# Patient Record
(demographics unavailable — no encounter records)

---

## 2025-02-24 NOTE — PHYSICAL EXAM
[General Appearance - Alert] : alert [General Appearance - In No Acute Distress] : in no acute distress [] : no respiratory distress [Respiration, Rhythm And Depth] : normal respiratory rhythm and effort [Heart Rate And Rhythm] : heart rate was normal and rhythm regular [Heart Sounds] : normal S1 and S2 [Bowel Sounds] : normal bowel sounds [Abdomen Soft] : soft [Abdomen Tenderness] : non-tender [FreeTextEntry1] : Obese abdomen [Abnormal Walk] : normal gait [Musculoskeletal - Swelling] : no joint swelling seen [Skin Color & Pigmentation] : normal skin color and pigmentation [Oriented To Time, Place, And Person] : oriented to person, place, and time [Affect] : the affect was normal

## 2025-02-24 NOTE — HISTORY OF PRESENT ILLNESS
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  He established care in February, 2024 after his initial hospitalization in January, 2024 after he presented to the hospital with hematemesis and melena. He had been planned for a colonoscopy on that day but his prep was not successful so he cancelled but later in the day started to feel sick with hematemesis, melena and dizziness. He eventually collapsed and EMS was called. That admission was his first knowledge of his diagnosis of liver cirrhosis. He had been told that he had fatty liver about 4-5 years ago and was advised to lose weight which he did. He weighed about 250lbs at his heaviest ~ for at least 2 years.  He Underwent EGD during his hospitalization with large varices noted s/p 5 bands. Was started on Coreg but he self-discontinued it. Has since then had another EGD in early May with 3 bands applied. Now back on Coreg 3.125mg BID with SBP in the low 100's. He has refused further EGD's despite understanding that it is standard of care. He also refused increasing his coreg to 6.25mg BID at today's visit.   2/24/25 Doing well. Offers no complaints. Denies any melena, hematochezia, hematemesis. He reports compliance with his Coreg 3.125mg BID. Does not take any other meds. Has gained weight (14lbs) although does not think he has added that much weight. He asked about my knowledge about a mysterious illness plaguing kids and teenagers that is possibly related to the covid vaccine and I advised that I was unaware of it. He has plans to travel with his twin boys (age 14) in the summer (no place yet)  ------------------------------------------------------------------------------------------------------------------------------------------------------ Retired in 2015 from [employer] (weighed about 250lbs). Drank heavily in the 80's --- for about 3-4 years while he was stationed abroad. But rarely drinks now. Nonsmoker, no illicit drug use no known family history of liver disease No prior abdominal surgeries  with 2 kids (two boys - twins, healthy), spends time with the kids

## 2025-02-24 NOTE — ASSESSMENT
[FreeTextEntry1] : 60 y/o M with MASH/MASLD induced Cirrhosis, prior morbid obesity. First seen in Feb 2024 for post hospital follow up. Seen by the liver team while he was hospitalized in Jan/2024 due to hematemesis, melena and initial diagnosis of cirrhosis.  # Decompensated cirrhosis - Etiology presumed due to MASLD/MASH. - CLD work up was negative in the hospital. Given elevated alk Ph, PBC markers also check and were negative. - No symptoms of decompensation. MELD score 3.0 is 6 (labs from May, 2024). - Ascites: No ascites on recent US from August 2024. Reinforced the importance of low salt diet. Will repeat now and in 6 months. - HE: none on exam. Advised to ensure daily BMs. - OLT evaluation: barriers will be low MELD and his hesitation to proceed. He appears to mistrust the medical field although has not explicitly said so. Fortunately, he appears to be re-compensating and is stable at this point. We plan to continue to have conversations moving forward. - No NSAIDS, can use Tylenol for pain but no greater than 2gm/day. - No alcohol, herbs and supplements. Counseled to discuss with us in case of doubt.  # History of obesity - Weight 186 (Aug/2024) - 180 (May/2024) - 184 (Feb 2024) ---> 200 (feb, 2025). - Advised to lose weight as this is likely the underlying etiology for his liver disease in the first place. He will try now that the holiday season is done. I stressed the importance of lifestyle changes to prevent further progression of liver disease.  # History of esophageal variceal bleeding - EGD (Jan 2024) grade III EV with red riley sign on EGD 1/5; s/p 5 bands. - Repeat EGD (May 2024) s/p 3 bands. His follow up EGD with Dr. Burns in June was cancelled as he could not find a  to bring him to his appointment, His subsequent October appointment with me was cancelled because he ate before his EGD. He has since decided that he does not wish to have another EGD despite knowing the risks of incomplete treatment. - On Coreg 3.125mg BID with no further episodes of bleeding. I discussed increasing his dose to 6.25mg BID especially since he is not undergoing EGD with banding to completion but he refused. "I just don't like taking medications. If I bleed, I will end up going to the hospital".  # HCC screening - Abd US (8/8/24) negative for HCC. No ascites. Patent vessels. Mild splenomegaly. - AFP (2/8/24) negative. repeat today. - Next screening in Feb 2025. Ordered. - Reinforced importance of HCC screening every 6 months.  # Patient Education - Patient educated about diagnosis of liver cirrhosis and discussed the natural course of liver cirrhosis - compensated versus decompensated. Also educated about the MELD score and its use. Advised about liver transplant being the definitive treatment for end stage liver disease and importance of HCC screening every 6mo. We discussed the importance of lifestyle modifications to prevent progression of his disease.   PLAN: - Continue Coreg 3.125mg BID. He has refused to increase his dose further despite the risks. - Reordered CBC, CMP, INR, AFP and liver US today as he did not do his pre-ordered testing before his visit today. - RTO in August 2025. Labs before his next visit ordered.